# Patient Record
Sex: MALE
[De-identification: names, ages, dates, MRNs, and addresses within clinical notes are randomized per-mention and may not be internally consistent; named-entity substitution may affect disease eponyms.]

---

## 2023-11-22 ENCOUNTER — APPOINTMENT (OUTPATIENT)
Dept: ORTHOPEDIC SURGERY | Facility: CLINIC | Age: 58
End: 2023-11-22
Payer: OTHER MISCELLANEOUS

## 2023-11-22 VITALS — HEIGHT: 72 IN | BODY MASS INDEX: 27.09 KG/M2 | WEIGHT: 200 LBS

## 2023-11-22 DIAGNOSIS — Z78.9 OTHER SPECIFIED HEALTH STATUS: ICD-10-CM

## 2023-11-22 PROCEDURE — 72110 X-RAY EXAM L-2 SPINE 4/>VWS: CPT

## 2023-11-22 PROCEDURE — 72170 X-RAY EXAM OF PELVIS: CPT

## 2023-11-22 PROCEDURE — 99204 OFFICE O/P NEW MOD 45 MIN: CPT

## 2023-12-18 ENCOUNTER — APPOINTMENT (OUTPATIENT)
Dept: CT IMAGING | Facility: CLINIC | Age: 58
End: 2023-12-18

## 2023-12-18 ENCOUNTER — APPOINTMENT (OUTPATIENT)
Dept: MRI IMAGING | Facility: CLINIC | Age: 58
End: 2023-12-18
Payer: OTHER MISCELLANEOUS

## 2023-12-18 PROCEDURE — 72148 MRI LUMBAR SPINE W/O DYE: CPT

## 2023-12-27 ENCOUNTER — APPOINTMENT (OUTPATIENT)
Dept: ORTHOPEDIC SURGERY | Facility: CLINIC | Age: 58
End: 2023-12-27
Payer: OTHER MISCELLANEOUS

## 2023-12-27 PROCEDURE — 99213 OFFICE O/P EST LOW 20 MIN: CPT

## 2023-12-27 NOTE — ASSESSMENT
[FreeTextEntry1] : 58 M with work injury s/p L3-5 XLIF with posterior interspinous instrumentation with new onset radicular paina fter fall L5-S1 HNP on MRI.  CT L Spine to rule out pseudoarthrosis given back pain. MRI L spine does not show bridign bone across L4-5.  Pain management referral for possible MICHELLE FU after CT

## 2023-12-27 NOTE — DATA REVIEWED
[MRI] : MRI [Lumbar Spine] : lumbar spine [I independently reviewed and interpreted images and report] : I independently reviewed and interpreted images and report [FreeTextEntry1] : L5-s1 Right sided HNP.  mIld stenosis at L2-3.

## 2023-12-27 NOTE — HISTORY OF PRESENT ILLNESS
[Work related] : work related [9] : 9 [4] : 4 [Dull/Aching] : dull/aching [Intermittent] : intermittent [Household chores] : household chores [Leisure] : leisure [Nothing helps with pain getting better] : Nothing helps with pain getting better [Standing] : standing [Walking] : walking [Part time] : Work status: part time [de-identified] : 11/22/23: Patient is here for a work related injury that occurred on 1/25/13 after tackling a someone as a . Tried epidural. Had lumbar fusion in 7/2013, and tried PT. Was able to manage pain for years until falling in 9/2023. Numbness in quad and toes. Intermittent spasms. Pain radiates down right leg. Worsens with prolonged sitting. Discomfort with walking. No recent treatment. Tried lido patches, and Advil.   Fell down in September had repeat fall and reaggravated back pain.   Back pain initially and severe RLE radic to calf.  No left sided pain.  Has tried lidocaine patches and advil.   First time being evaluated since this fall.  Prior to this fall Had mild back pain no radicular complaints.  No bowel or bladder symptoms.  no fevers or chills.   Original surgery done by Dr. Austin at [facility].    Retired   12/27/23 follow up workers comp to review l spine mri , no changes.  CT L spine not approved.  Pain imrpoved with MDP but recurred.   [] : no [FreeTextEntry1] : lumbar spine  [FreeTextEntry3] : 1/25/2013 [FreeTextEntry7] : right leg  [de-identified] : mri done at [facility] [de-identified] : nothing

## 2023-12-27 NOTE — IMAGING
[de-identified] : Spine: Inspection/Palpation: No tenderness to palpation throughout Cervical/thoracic/lumbar spine. No bony stepoffs, No lesions.  Gait: Non-antalgic, able to perform bilateral toe and heel rise.  Able to perform tandem gait.    Neurologic:  Bilateral Lower Extremities 5/5 Iliopsoas/Quadriceps/Hamstrings/ Tibialis Anterior/ Gastrocnemius. Extensor Hallucis Longus/ Flexor Hallucis Longus except    Sensation intact to light touch L2-S1  Patellar/ Achilles reflex within normal limits.   X-ray Ap/Lateral/Flexion/Extension of lumbar spine were viewed and interpreted.   S/;P L3-5 XLIF and posterior interspinous instrumentation.  hardware in good position. L5-s1 disc height loss.

## 2023-12-28 ENCOUNTER — APPOINTMENT (OUTPATIENT)
Dept: PAIN MANAGEMENT | Facility: CLINIC | Age: 58
End: 2023-12-28
Payer: OTHER MISCELLANEOUS

## 2023-12-28 VITALS — BODY MASS INDEX: 26.14 KG/M2 | WEIGHT: 193 LBS | HEIGHT: 72 IN

## 2023-12-28 PROCEDURE — 99204 OFFICE O/P NEW MOD 45 MIN: CPT

## 2023-12-28 NOTE — ASSESSMENT
[FreeTextEntry1] : After discussing various treatment options with the patient including but not limited to oral medications, physical therapy, exercise, modalities as well as interventional spinal injections, we have decided with the following plan:  1) Intervention Injection Therapy: I personally reviewed the MRI/CT scan images and agree with the radiologist's report. The radiological findings were discussed with the patient. The risks, benefits, contents and alternatives to injection were explained in full to the patient. Risks outlined include but are not limited to infection,sepsis, bleeding, post-dural puncture headache, nerve damage, temporary increase in pain, syncopal episode, failure to resolve symptoms, allergic reaction, symptom recurrence, and elevation of blood sugar in diabetics. Cortisone may cause immunosuppression. Patient understands the risks. All questions were answered. After discussion of options, patient requested an injection. Information regarding the injection was given to the patient. Which medications to stop prior to the injection was explained to the patient as well.  Follow up in 1-2 weeks post injection for re-evaluation.  Continue Home exercises, stretching, activity modification, physical therapy, and conservative care.  Patient is presenting with acute/sub-acute radicular pain with impairment in ADLs and functionality.  The pain has not responded sufficiently to  conservative care including nsaid therapy and/or physical therapy.  There is no bleeding tendency, unstable medical condition, or systemic infection. The purpose of the spinal injections is to facilitate active therapy by providing short term relief through reduction of pain and inflammation.   Injections, by themselves, are not likely to provide long-term relief. Rather, active rehabilitation with modified work achieves long-term relief by increasing active ROM, strength and stability.   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  Caudal MICHELLE

## 2023-12-28 NOTE — HISTORY OF PRESENT ILLNESS
[Lower back] : lower back [Work related] : work related [7] : 7 [3] : 3 [Burning] : burning [Shooting] : shooting [Intermittent] : intermittent [Nothing helps with pain getting better] : Nothing helps with pain getting better [Sitting] : sitting [FreeTextEntry1] : 12/28/2023 : Patient presents for initial evaluation. He is having pain on his lower back, and it goes down to his buttocks and right leg. If he moves wrong the patient gets spasms.   Then fell 2 months ago and had pain in low back.  Has numbness in right thigh and into right toe.  Pain on right leg goes down back of right leg and top of left quad.  Feels some weakness in right leg.  Is working part time.   DOI: 1/25/13- while working as a  .   Awaiting CT scan to visualize if fused.    Subjective Weakness: Yes Numbness/Tingling: Yes Bladder/Bowel dysfunction: No Treatments Tried: Back surgery, physical therapy  Attempted modalities for current pain complaint: See above: Medications: Yes  Injections: Prior to surgery   Previous Spine Surgery: Dr. Townsend L3/4 L4/5 discectomy with interbody spacers - 2013  Imaging: MRI Lumbar Spine (12/18/23) post op at L3/4 L4/5. 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 central and right sided DH with mild compression of the right S1 nerve root.    [] : no [FreeTextEntry3] : 1/25/13 [FreeTextEntry6] : spasms  [FreeTextEntry7] : buttock, right leg [de-identified] : sitting straight, getting up in the morning  [de-identified] : l mri

## 2024-01-16 ENCOUNTER — RX RENEWAL (OUTPATIENT)
Age: 59
End: 2024-01-16

## 2024-01-22 ENCOUNTER — APPOINTMENT (OUTPATIENT)
Dept: CT IMAGING | Facility: CLINIC | Age: 59
End: 2024-01-22
Payer: OTHER MISCELLANEOUS

## 2024-01-22 PROCEDURE — 72131 CT LUMBAR SPINE W/O DYE: CPT

## 2024-01-25 ENCOUNTER — APPOINTMENT (OUTPATIENT)
Dept: PAIN MANAGEMENT | Facility: CLINIC | Age: 59
End: 2024-01-25
Payer: OTHER MISCELLANEOUS

## 2024-01-25 PROCEDURE — J3490M: CUSTOM

## 2024-01-25 PROCEDURE — 62323 NJX INTERLAMINAR LMBR/SAC: CPT

## 2024-02-15 ENCOUNTER — APPOINTMENT (OUTPATIENT)
Dept: PAIN MANAGEMENT | Facility: CLINIC | Age: 59
End: 2024-02-15
Payer: OTHER MISCELLANEOUS

## 2024-02-15 VITALS — HEIGHT: 72 IN | WEIGHT: 193 LBS | BODY MASS INDEX: 26.14 KG/M2

## 2024-02-15 PROCEDURE — 99213 OFFICE O/P EST LOW 20 MIN: CPT

## 2024-02-15 NOTE — HISTORY OF PRESENT ILLNESS
[Lower back] : lower back [Work related] : work related [3] : 3 [Shooting] : shooting [Intermittent] : intermittent [5] : 5 [Sharp] : sharp [Work] : work [Injection therapy] : injection therapy [Stairs] : stairs [FreeTextEntry1] : 02/15/2024: follow up today for caudal on 1/25 with 85% relief.  Occasionaly gets spasms in low back.  12/28/2023 : Patient presents for initial evaluation. He is having pain on his lower back, and it goes down to his buttocks and right leg. If he moves wrong the patient gets spasms.   Then fell 2 months ago and had pain in low back.  Has numbness in right thigh and into right toe.  Pain on right leg goes down back of right leg and top of left quad.  Feels some weakness in right leg.  Is working part time.   DOI: 1/25/13- while working as a  .   Awaiting CT scan to visualize if fused.    Subjective Weakness: Yes Numbness/Tingling: Yes Bladder/Bowel dysfunction: No Treatments Tried: Back surgery, physical therapy  Attempted modalities for current pain complaint: See above: Medications: Yes  Injections: Prior to surgery  Caudal 1/25/24 Previous Spine Surgery: Dr. Townsend L3/4 L4/5 discectomy with interbody spacers - 2013  Imaging: MRI Lumbar Spine (12/18/23) post op at L3/4 L4/5. 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 central and right sided DH with mild compression of the right S1 nerve root.    [] : no [FreeTextEntry3] : 1/25/13 [FreeTextEntry6] : spasms, weakness, [de-identified] : l mri

## 2024-03-15 ENCOUNTER — RX RENEWAL (OUTPATIENT)
Age: 59
End: 2024-03-15

## 2024-04-01 ENCOUNTER — APPOINTMENT (OUTPATIENT)
Dept: PAIN MANAGEMENT | Facility: CLINIC | Age: 59
End: 2024-04-01
Payer: OTHER MISCELLANEOUS

## 2024-04-01 VITALS — HEIGHT: 72 IN | WEIGHT: 200 LBS | BODY MASS INDEX: 27.09 KG/M2

## 2024-04-01 DIAGNOSIS — M51.27 OTHER INTERVERTEBRAL DISC DISPLACEMENT, LUMBOSACRAL REGION: ICD-10-CM

## 2024-04-01 PROCEDURE — 99213 OFFICE O/P EST LOW 20 MIN: CPT

## 2024-04-01 RX ORDER — PREGABALIN 75 MG/1
75 CAPSULE ORAL TWICE DAILY
Qty: 60 | Refills: 2 | Status: ACTIVE | COMMUNITY
Start: 2024-04-01 | End: 1900-01-01

## 2024-04-01 RX ORDER — METHYLPREDNISOLONE 4 MG/1
4 TABLET ORAL
Qty: 1 | Refills: 0 | Status: ACTIVE | COMMUNITY
Start: 2023-11-22 | End: 1900-01-01

## 2024-04-01 NOTE — HISTORY OF PRESENT ILLNESS
[Lower back] : lower back [Work related] : work related [5] : 5 [3] : 3 [Sharp] : sharp [Shooting] : shooting [Intermittent] : intermittent [Work] : work [Injection therapy] : injection therapy [Stairs] : stairs [10] : 10 [Radiating] : radiating [Walking] : walking [FreeTextEntry1] : 4/1/24- fu for pain in low back.  Has numbness in both legs and pain.  Will start Lyrica and send MDP.  He had great relief >80% from caudal in January for at least 2 months.  Would benefit from repeat.  Patient with at least 50% overall improvement following epidural. Improvements in ROM, strength and pain. This leads to an overall improvement in patients quality of life. I would recommend repeat MICHELLE for cumulative improvement.  02/15/2024: follow up today for caudal on 1/25 with 85% relief.  Occasionaly gets spasms in low back.  12/28/2023 : Patient presents for initial evaluation. He is having pain on his lower back, and it goes down to his buttocks and right leg. If he moves wrong the patient gets spasms.   Then fell 2 months ago and had pain in low back.  Has numbness in right thigh and into right toe.  Pain on right leg goes down back of right leg and top of left quad.  Feels some weakness in right leg.  Is working part time.   DOI: 1/25/13- while working as a  .   Awaiting CT scan to visualize if fused.    Subjective Weakness: Yes Numbness/Tingling: Yes Bladder/Bowel dysfunction: No Treatments Tried: Back surgery, physical therapy  Attempted modalities for current pain complaint: See above: Medications: Yes  Injections: Prior to surgery  Caudal 1/25/24 Previous Spine Surgery: Dr. Townsend L3/4 L4/5 discectomy with interbody spacers - 2013  Imaging: MRI Lumbar Spine (12/18/23) post op at L3/4 L4/5. 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 central and right sided DH with mild compression of the right S1 nerve root.    [] : no [FreeTextEntry3] : 1/25/13 [FreeTextEntry7] : b/l legs [FreeTextEntry6] : spasms, weakness, pins and needles  [de-identified] : l mri

## 2024-04-01 NOTE — ASSESSMENT
[FreeTextEntry1] : After discussing various treatment options with the patient including but not limited to oral medications, physical therapy, exercise, modalities as well as interventional spinal injections, we have decided with the following plan:  1) Intervention Injection Therapy: I personally reviewed the MRI/CT scan images and agree with the radiologist's report. The radiological findings were discussed with the patient. The risks, benefits, contents and alternatives to injection were explained in full to the patient. Risks outlined include but are not limited to infection,sepsis, bleeding, post-dural puncture headache, nerve damage, temporary increase in pain, syncopal episode, failure to resolve symptoms, allergic reaction, symptom recurrence, and elevation of blood sugar in diabetics. Cortisone may cause immunosuppression. Patient understands the risks. All questions were answered. After discussion of options, patient requested an injection. Information regarding the injection was given to the patient. Which medications to stop prior to the injection was explained to the patient as well.  Follow up in 1-2 weeks post injection for re-evaluation.  Continue Home exercises, stretching, activity modification, physical therapy, and conservative care.  Patient is presenting with acute/sub-acute radicular pain with impairment in ADLs and functionality.  The pain has not responded sufficiently to  conservative care including nsaid therapy and/or physical therapy.  There is no bleeding tendency, unstable medical condition, or systemic infection. The purpose of the spinal injections is to facilitate active therapy by providing short term relief through reduction of pain and inflammation.   Injections, by themselves, are not likely to provide long-term relief. Rather, active rehabilitation with modified work achieves long-term relief by increasing active ROM, strength and stability.   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  caudal MICHELLE  I would recommend a trial of neuropathic medication as patient presents with signs of nerve irritation. (ie burning, paresthesias etc) Goals of therapy would be to improve pain and overall QOL. Side effects reviewed with patient. Patient will call or stop medication if given side effects occur. .

## 2024-04-25 ENCOUNTER — APPOINTMENT (OUTPATIENT)
Dept: PAIN MANAGEMENT | Facility: CLINIC | Age: 59
End: 2024-04-25
Payer: OTHER MISCELLANEOUS

## 2024-04-25 PROCEDURE — 62323 NJX INTERLAMINAR LMBR/SAC: CPT

## 2024-04-25 NOTE — PROCEDURE
[FreeTextEntry3] : Date of Service: 04/25/2024   Account: [id number]   Patient: MALAIKA SU   YOB: 1965   Age: 58 year     Surgeon:      Marii Stafford M.D.   Pre-Operative Diagnosis:                              Lumbar Radiculitis (54.17)   Post Operative Diagnosis:                            Same   Procedure:                                                       Caudal epidural steroid injection  under fluoroscopic guidance   Anesthesia:                                                      None     This procedure was carried out using fluoroscopic guidance.  The risks and benefits of the procedure were discussed extensively with the patient.  Risks included infection, bleeding, epidural hematoma, nerve damage, and post-dural puncture headache.  The consent of the patient was obtained and the following procedure was performed.   The patient was placed in the prone position using a pillow under the abdomen to reduce the lumbar lordosis.  The lumbo-sacral area was prepped and draped in a sterile fashion.  Using fluoroscopic guidance the sacrum was visualized using a lateral view.   Using sterile technique the superficial skin was anesthetized with 1.5% Lidocaine without epinephrine.  A 22 inch spinal needle was advanced under fluoroscopy using [id number] technique until the sacral-coccygeal ligament was engaged and penetrated.  After confirmation of needle placement in the epidural space, the needle was advanced to approximately the S3 level.    After negative aspiration for heme and CSF, 3 cc of Omnipaque confirmed good lumbar epiduragram.  An AP view was used to confirm dye spread.  An injectate of 10 cc of preservative free normal saline, 0.25% marcaine and 1% Lidocaine plus 80 mg of Kenalog was then injected into the epidural space. The needle was subsequently removed and pressure was applied.   The patient tolerated the procedure well without any complications.  Vitals signs remained within normal limits throughout the procedure.  The patient was instructed to apply ice to the area for approximately 20 minutes 3-4  times for the next 24 hours.  The patient was also instructed to contact me immediately if there were any problems.   Marii Stafford M.D.

## 2024-05-07 ENCOUNTER — RX RENEWAL (OUTPATIENT)
Age: 59
End: 2024-05-07

## 2024-05-07 RX ORDER — MELOXICAM 15 MG/1
15 TABLET ORAL DAILY
Qty: 28 | Refills: 1 | Status: ACTIVE | COMMUNITY
Start: 2023-11-22 | End: 1900-01-01

## 2024-05-09 ENCOUNTER — APPOINTMENT (OUTPATIENT)
Dept: PAIN MANAGEMENT | Facility: CLINIC | Age: 59
End: 2024-05-09
Payer: OTHER MISCELLANEOUS

## 2024-05-09 VITALS — BODY MASS INDEX: 27.77 KG/M2 | WEIGHT: 205 LBS | HEIGHT: 72 IN

## 2024-05-09 DIAGNOSIS — M54.41 LUMBAGO WITH SCIATICA, RIGHT SIDE: ICD-10-CM

## 2024-05-09 DIAGNOSIS — G89.29 LUMBAGO WITH SCIATICA, RIGHT SIDE: ICD-10-CM

## 2024-05-09 PROCEDURE — 99214 OFFICE O/P EST MOD 30 MIN: CPT

## 2024-05-09 NOTE — ASSESSMENT

## 2024-05-09 NOTE — HISTORY OF PRESENT ILLNESS
[Lower back] : lower back [Work related] : work related [3] : 3 [Radiating] : radiating [Sharp] : sharp [Shooting] : shooting [Intermittent] : intermittent [Work] : work [Injection therapy] : injection therapy [Walking] : walking [Stairs] : stairs [7] : 7 [Leisure] : leisure [FreeTextEntry1] : 05/09/2024: follow up today for caudal on 4/25 without relief. Pain mostly in the lower back, worse in the morning. Gets better throughout the day. Lyrica caused 20lb weight gain. On Meloxicam for awhile.  Gets intermittent shooting pain in the legs and numbness in the feet with prolonged walking.   4/1/24- fu for pain in low back.  Has numbness in both legs and pain.  Will start Lyrica and send MDP.  He had great relief >80% from caudal in January for at least 2 months.  Would benefit from repeat.  Patient with at least 50% overall improvement following epidural. Improvements in ROM, strength and pain. This leads to an overall improvement in patients quality of life. I would recommend repeat MICHELLE for cumulative improvement.  02/15/2024: follow up today for caudal on 1/25 with 85% relief.  Occasionally gets spasms in low back.  12/28/2023 : Patient presents for initial evaluation. He is having pain on his lower back, and it goes down to his buttocks and right leg. If he moves wrong the patient gets spasms.   Then fell 2 months ago and had pain in low back.  Has numbness in right thigh and into right toe.  Pain on right leg goes down back of right leg and top of left quad.  Feels some weakness in right leg.  Is working part time.   DOI: 1/25/13- while working as a  .   Awaiting CT scan to visualize if fused.    Subjective Weakness: Yes Numbness/Tingling: Yes Bladder/Bowel dysfunction: No Treatments Tried: Back surgery, physical therapy  Attempted modalities for current pain complaint: See above: Medications: Yes  Injections: Prior to surgery  Caudal 1/25/24 Previous Spine Surgery: Dr. Townsend L3/4 L4/5 discectomy with interbody spacers - 2013  Imaging: MRI Lumbar Spine (12/18/23) post op at L3/4 L4/5. 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 central and right sided DH with mild compression of the right S1 nerve root.    [] : no [FreeTextEntry3] : 1/25/13 [FreeTextEntry6] : spasms, weakness, pins and needles  [FreeTextEntry7] : b/l legs, b/l feet  [de-identified] : l mri

## 2024-06-06 ENCOUNTER — APPOINTMENT (OUTPATIENT)
Dept: PAIN MANAGEMENT | Facility: CLINIC | Age: 59
End: 2024-06-06
Payer: OTHER MISCELLANEOUS

## 2024-06-06 VITALS — WEIGHT: 200 LBS | BODY MASS INDEX: 27.09 KG/M2 | HEIGHT: 72 IN

## 2024-06-06 DIAGNOSIS — M47.816 SPONDYLOSIS W/OUT MYELOPATHY OR RADICULOPATHY, LUMBAR REGION: ICD-10-CM

## 2024-06-06 PROCEDURE — 99214 OFFICE O/P EST MOD 30 MIN: CPT

## 2024-06-06 RX ORDER — HYDROCODONE BITARTRATE AND ACETAMINOPHEN 5; 325 MG/1; MG/1
5-325 TABLET ORAL EVERY 8 HOURS
Qty: 21 | Refills: 0 | Status: ACTIVE | COMMUNITY
Start: 2024-06-06 | End: 1900-01-01

## 2024-06-06 NOTE — HISTORY OF PRESENT ILLNESS
[Lower back] : lower back [Work related] : work related [3] : 3 [Radiating] : radiating [Sharp] : sharp [Shooting] : shooting [Intermittent] : intermittent [Leisure] : leisure [Work] : work [Injection therapy] : injection therapy [Walking] : walking [Stairs] : stairs [10] : 10 [7] : 7 [Stabbing] : stabbing [Constant] : constant [Household chores] : household chores [Sleep] : sleep [Standing] : standing [FreeTextEntry1] : 06/06/2024: Follow up today. Denial initially states that pain is mostly radicular, however patient biggest complaint is pain in the lower back and buttocks which is AXIAL. The blocks are meant to be diagnostic for facet mediated pain, so denial stating that there is no documentation of facet mediated pain is redundant.  The procedure is meant to help diagnose his axial lower back pain. No longer taking NSAIDS as he was gaining weight.  He has been more sedentary this last month.  You can still have facet mediated pain even if fused. Arthritis and spondylosis is still present despite fusion.  he has facet arthrosis on MRI and CT scan.   05/09/2024: follow up today for caudal on 4/25 without relief. Pain mostly in the lower back, worse in the morning. Gets better throughout the day. Lyrica caused 20lb weight gain. On Meloxicam for awhile.  Gets intermittent shooting pain in the legs and numbness in the feet with prolonged walking.   4/1/24- fu for pain in low back.  Has numbness in both legs and pain.  Will start Lyrica and send MDP.  He had great relief >80% from caudal in January for at least 2 months.  Would benefit from repeat.  Patient with at least 50% overall improvement following epidural. Improvements in ROM, strength and pain. This leads to an overall improvement in patients quality of life. I would recommend repeat MICHELLE for cumulative improvement.  02/15/2024: follow up today for caudal on 1/25 with 85% relief.  Occasionally gets spasms in low back.  12/28/2023 : Patient presents for initial evaluation. He is having pain on his lower back, and it goes down to his buttocks and right leg. If he moves wrong the patient gets spasms.   Then fell 2 months ago and had pain in low back.  Has numbness in right thigh and into right toe.  Pain on right leg goes down back of right leg and top of left quad.  Feels some weakness in right leg.  Is working part time.   DOI: 1/25/13- while working as a  .   Awaiting CT scan to visualize if fused.    Subjective Weakness: Yes Numbness/Tingling: Yes Bladder/Bowel dysfunction: No Treatments Tried: Back surgery, physical therapy  Attempted modalities for current pain complaint: See above: Medications: Yes  Injections: Prior to surgery  Caudal 1/25/24 Previous Spine Surgery: Dr. Townsend L3/4 L4/5 discectomy with interbody spacers - 2013  Imaging: MRI Lumbar Spine (12/18/23) post op at L3/4 L4/5. 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 central and right sided DH with mild compression of the right S1 nerve root.    [] : Post Surgical Visit: no [FreeTextEntry3] : 1/25/13 [FreeTextEntry6] : spasms, weakness, pins and needles  [FreeTextEntry7] : b/l legs, b/l feet, buttocks [de-identified] : l mri [de-identified] : getting up in the morning

## 2024-07-03 ENCOUNTER — APPOINTMENT (OUTPATIENT)
Dept: ORTHOPEDIC SURGERY | Facility: CLINIC | Age: 59
End: 2024-07-03

## 2024-07-18 ENCOUNTER — APPOINTMENT (OUTPATIENT)
Dept: PAIN MANAGEMENT | Facility: CLINIC | Age: 59
End: 2024-07-18
Payer: OTHER MISCELLANEOUS

## 2024-07-18 DIAGNOSIS — M47.816 SPONDYLOSIS W/OUT MYELOPATHY OR RADICULOPATHY, LUMBAR REGION: ICD-10-CM

## 2024-07-18 PROCEDURE — 64494 INJ PARAVERT F JNT L/S 2 LEV: CPT | Mod: 59,RT

## 2024-07-18 PROCEDURE — 64493 INJ PARAVERT F JNT L/S 1 LEV: CPT | Mod: LT

## 2024-07-18 PROCEDURE — J3490M: CUSTOM

## 2024-08-01 ENCOUNTER — APPOINTMENT (OUTPATIENT)
Dept: PAIN MANAGEMENT | Facility: CLINIC | Age: 59
End: 2024-08-01

## 2024-08-08 ENCOUNTER — APPOINTMENT (OUTPATIENT)
Dept: PAIN MANAGEMENT | Facility: CLINIC | Age: 59
End: 2024-08-08

## 2024-08-08 PROCEDURE — 99214 OFFICE O/P EST MOD 30 MIN: CPT

## 2024-08-08 NOTE — HISTORY OF PRESENT ILLNESS
[Lower back] : lower back [Work related] : work related [Radiating] : radiating [Sharp] : sharp [Constant] : constant [Work] : work [Injection therapy] : injection therapy [3] : 3 [1] : 2 [Shooting] : shooting [Walking] : walking [FreeTextEntry1] : 08/08/2024: follow up today for B/L lumbar MBB on 7/18 06/06/2024: Follow up today. Denial initially states that pain is mostly radicular, however patient biggest complaint is pain in the lower back and buttocks which is AXIAL. The blocks are meant to be diagnostic for facet mediated pain, so denial stating that there is no documentation of facet mediated pain is redundant.  The procedure is meant to help diagnose his axial lower back pain. No longer taking NSAIDS as he was gaining weight.  He has been more sedentary this last month.  You can still have facet mediated pain even if fused. Arthritis and spondylosis is still present despite fusion.  he has facet arthrosis on MRI and CT scan.   05/09/2024: follow up today for caudal on 4/25 without relief. Pain mostly in the lower back, worse in the morning. Gets better throughout the day. Lyrica caused 20lb weight gain. On Meloxicam for awhile.  Gets intermittent shooting pain in the legs and numbness in the feet with prolonged walking.   4/1/24- fu for pain in low back.  Has numbness in both legs and pain.  Will start Lyrica and send MDP.  He had great relief >80% from caudal in January for at least 2 months.  Would benefit from repeat.  Patient with at least 50% overall improvement following epidural. Improvements in ROM, strength and pain. This leads to an overall improvement in patients quality of life. I would recommend repeat MICHELLE for cumulative improvement.  02/15/2024: follow up today for caudal on 1/25 with 85% relief.  Occasionally gets spasms in low back.  12/28/2023 : Patient presents for initial evaluation. He is having pain on his lower back, and it goes down to his buttocks and right leg. If he moves wrong the patient gets spasms.   Then fell 2 months ago and had pain in low back.  Has numbness in right thigh and into right toe.  Pain on right leg goes down back of right leg and top of left quad.  Feels some weakness in right leg.  Is working part time.   DOI: 1/25/13- while working as a  .   Awaiting CT scan to visualize if fused.    Subjective Weakness: Yes Numbness/Tingling: Yes Bladder/Bowel dysfunction: No Treatments Tried: Back surgery, physical therapy  Attempted modalities for current pain complaint: See above: Medications: Yes  Injections: Prior to surgery  Caudal 1/25/24 B/L lumbar MBB 7/18/24 Previous Spine Surgery: Dr. Townsend L3/4 L4/5 discectomy with interbody spacers - 2013  Imaging: MRI Lumbar Spine (12/18/23) post op at L3/4 L4/5. 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 central and right sided DH with mild compression of the right S1 nerve root.    [] : Post Surgical Visit: no [FreeTextEntry3] : 1/25/13 [FreeTextEntry6] : spasms, weakness, pins and needles  [FreeTextEntry7] : b/l legs, b/l feet, buttocks [de-identified] : getting up in the morning [de-identified] : l mri intact

## 2024-08-08 NOTE — HISTORY OF PRESENT ILLNESS
[Lower back] : lower back [Work related] : work related [Radiating] : radiating [Sharp] : sharp [Constant] : constant [Work] : work [Injection therapy] : injection therapy [3] : 3 [1] : 2 [Shooting] : shooting [Walking] : walking [FreeTextEntry1] : 08/08/2024: follow up today for B/L lumbar MBB on 7/18 06/06/2024: Follow up today. Denial initially states that pain is mostly radicular, however patient biggest complaint is pain in the lower back and buttocks which is AXIAL. The blocks are meant to be diagnostic for facet mediated pain, so denial stating that there is no documentation of facet mediated pain is redundant.  The procedure is meant to help diagnose his axial lower back pain. No longer taking NSAIDS as he was gaining weight.  He has been more sedentary this last month.  You can still have facet mediated pain even if fused. Arthritis and spondylosis is still present despite fusion.  he has facet arthrosis on MRI and CT scan.   05/09/2024: follow up today for caudal on 4/25 without relief. Pain mostly in the lower back, worse in the morning. Gets better throughout the day. Lyrica caused 20lb weight gain. On Meloxicam for awhile.  Gets intermittent shooting pain in the legs and numbness in the feet with prolonged walking.   4/1/24- fu for pain in low back.  Has numbness in both legs and pain.  Will start Lyrica and send MDP.  He had great relief >80% from caudal in January for at least 2 months.  Would benefit from repeat.  Patient with at least 50% overall improvement following epidural. Improvements in ROM, strength and pain. This leads to an overall improvement in patients quality of life. I would recommend repeat MICHELLE for cumulative improvement.  02/15/2024: follow up today for caudal on 1/25 with 85% relief.  Occasionally gets spasms in low back.  12/28/2023 : Patient presents for initial evaluation. He is having pain on his lower back, and it goes down to his buttocks and right leg. If he moves wrong the patient gets spasms.   Then fell 2 months ago and had pain in low back.  Has numbness in right thigh and into right toe.  Pain on right leg goes down back of right leg and top of left quad.  Feels some weakness in right leg.  Is working part time.   DOI: 1/25/13- while working as a  .   Awaiting CT scan to visualize if fused.    Subjective Weakness: Yes Numbness/Tingling: Yes Bladder/Bowel dysfunction: No Treatments Tried: Back surgery, physical therapy  Attempted modalities for current pain complaint: See above: Medications: Yes  Injections: Prior to surgery  Caudal 1/25/24 B/L lumbar MBB 7/18/24 Previous Spine Surgery: Dr. Townsend L3/4 L4/5 discectomy with interbody spacers - 2013  Imaging: MRI Lumbar Spine (12/18/23) post op at L3/4 L4/5. 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 central and right sided DH with mild compression of the right S1 nerve root.    [] : Post Surgical Visit: no [FreeTextEntry3] : 1/25/13 [FreeTextEntry6] : spasms, weakness, pins and needles  [FreeTextEntry7] : b/l legs, b/l feet, buttocks [de-identified] : getting up in the morning [de-identified] : l mri

## 2024-08-08 NOTE — ASSESSMENT

## 2024-08-15 ENCOUNTER — APPOINTMENT (OUTPATIENT)
Dept: PAIN MANAGEMENT | Facility: CLINIC | Age: 59
End: 2024-08-15

## 2024-08-29 ENCOUNTER — APPOINTMENT (OUTPATIENT)
Dept: PAIN MANAGEMENT | Facility: CLINIC | Age: 59
End: 2024-08-29

## 2024-10-10 ENCOUNTER — APPOINTMENT (OUTPATIENT)
Dept: PAIN MANAGEMENT | Facility: CLINIC | Age: 59
End: 2024-10-10
Payer: OTHER MISCELLANEOUS

## 2024-10-10 DIAGNOSIS — M47.816 SPONDYLOSIS W/OUT MYELOPATHY OR RADICULOPATHY, LUMBAR REGION: ICD-10-CM

## 2024-10-10 PROCEDURE — 64493 INJ PARAVERT F JNT L/S 1 LEV: CPT | Mod: 59,RT

## 2024-10-10 PROCEDURE — 64494 INJ PARAVERT F JNT L/S 2 LEV: CPT | Mod: 59,RT

## 2024-10-10 PROCEDURE — J3490M: CUSTOM

## 2024-10-31 ENCOUNTER — APPOINTMENT (OUTPATIENT)
Dept: PAIN MANAGEMENT | Facility: CLINIC | Age: 59
End: 2024-10-31
Payer: OTHER MISCELLANEOUS

## 2024-10-31 VITALS — HEIGHT: 72 IN | WEIGHT: 210 LBS | BODY MASS INDEX: 28.44 KG/M2

## 2024-10-31 DIAGNOSIS — M47.816 SPONDYLOSIS W/OUT MYELOPATHY OR RADICULOPATHY, LUMBAR REGION: ICD-10-CM

## 2024-10-31 PROCEDURE — 99214 OFFICE O/P EST MOD 30 MIN: CPT

## 2024-12-19 ENCOUNTER — APPOINTMENT (OUTPATIENT)
Dept: PAIN MANAGEMENT | Facility: CLINIC | Age: 59
End: 2024-12-19
Payer: OTHER MISCELLANEOUS

## 2024-12-19 DIAGNOSIS — M47.816 SPONDYLOSIS W/OUT MYELOPATHY OR RADICULOPATHY, LUMBAR REGION: ICD-10-CM

## 2024-12-19 PROCEDURE — 64636Z: CUSTOM | Mod: 59,LT

## 2024-12-19 PROCEDURE — 64635 DESTROY LUMB/SAC FACET JNT: CPT | Mod: LT

## 2025-01-02 ENCOUNTER — APPOINTMENT (OUTPATIENT)
Dept: PAIN MANAGEMENT | Facility: CLINIC | Age: 60
End: 2025-01-02
Payer: OTHER MISCELLANEOUS

## 2025-01-02 VITALS — BODY MASS INDEX: 28.71 KG/M2 | WEIGHT: 212 LBS | HEIGHT: 72 IN

## 2025-01-02 DIAGNOSIS — M47.816 SPONDYLOSIS W/OUT MYELOPATHY OR RADICULOPATHY, LUMBAR REGION: ICD-10-CM

## 2025-01-02 PROCEDURE — 99214 OFFICE O/P EST MOD 30 MIN: CPT

## 2025-02-13 ENCOUNTER — APPOINTMENT (OUTPATIENT)
Dept: PAIN MANAGEMENT | Facility: CLINIC | Age: 60
End: 2025-02-13
Payer: OTHER MISCELLANEOUS

## 2025-02-13 VITALS — WEIGHT: 216 LBS | BODY MASS INDEX: 29.26 KG/M2 | HEIGHT: 72 IN

## 2025-02-13 DIAGNOSIS — M54.41 LUMBAGO WITH SCIATICA, RIGHT SIDE: ICD-10-CM

## 2025-02-13 DIAGNOSIS — M47.816 SPONDYLOSIS W/OUT MYELOPATHY OR RADICULOPATHY, LUMBAR REGION: ICD-10-CM

## 2025-02-13 DIAGNOSIS — G89.29 LUMBAGO WITH SCIATICA, RIGHT SIDE: ICD-10-CM

## 2025-02-13 PROCEDURE — 96372 THER/PROPH/DIAG INJ SC/IM: CPT

## 2025-02-13 PROCEDURE — 99214 OFFICE O/P EST MOD 30 MIN: CPT | Mod: 25

## 2025-03-20 ENCOUNTER — APPOINTMENT (OUTPATIENT)
Dept: PAIN MANAGEMENT | Facility: CLINIC | Age: 60
End: 2025-03-20
Payer: OTHER MISCELLANEOUS

## 2025-03-20 DIAGNOSIS — G89.29 LUMBAGO WITH SCIATICA, RIGHT SIDE: ICD-10-CM

## 2025-03-20 DIAGNOSIS — M54.41 LUMBAGO WITH SCIATICA, RIGHT SIDE: ICD-10-CM

## 2025-03-20 PROCEDURE — J3490M: CUSTOM

## 2025-03-20 PROCEDURE — 62323 NJX INTERLAMINAR LMBR/SAC: CPT

## 2025-04-10 ENCOUNTER — APPOINTMENT (OUTPATIENT)
Dept: PAIN MANAGEMENT | Facility: CLINIC | Age: 60
End: 2025-04-10
Payer: OTHER MISCELLANEOUS

## 2025-04-10 VITALS — BODY MASS INDEX: 31.15 KG/M2 | WEIGHT: 230 LBS | HEIGHT: 72 IN

## 2025-04-10 DIAGNOSIS — G89.29 LUMBAGO WITH SCIATICA, RIGHT SIDE: ICD-10-CM

## 2025-04-10 DIAGNOSIS — M54.41 LUMBAGO WITH SCIATICA, RIGHT SIDE: ICD-10-CM

## 2025-04-10 DIAGNOSIS — M51.27 OTHER INTERVERTEBRAL DISC DISPLACEMENT, LUMBOSACRAL REGION: ICD-10-CM

## 2025-04-10 PROCEDURE — 99213 OFFICE O/P EST LOW 20 MIN: CPT

## 2025-04-14 ENCOUNTER — TRANSCRIPTION ENCOUNTER (OUTPATIENT)
Age: 60
End: 2025-04-14

## 2025-05-27 ENCOUNTER — APPOINTMENT (OUTPATIENT)
Dept: PAIN MANAGEMENT | Facility: CLINIC | Age: 60
End: 2025-05-27
Payer: OTHER MISCELLANEOUS

## 2025-05-27 VITALS — HEIGHT: 72 IN | BODY MASS INDEX: 31.15 KG/M2 | WEIGHT: 230 LBS

## 2025-05-27 DIAGNOSIS — G89.29 LUMBAGO WITH SCIATICA, RIGHT SIDE: ICD-10-CM

## 2025-05-27 DIAGNOSIS — M47.816 SPONDYLOSIS W/OUT MYELOPATHY OR RADICULOPATHY, LUMBAR REGION: ICD-10-CM

## 2025-05-27 DIAGNOSIS — M54.41 LUMBAGO WITH SCIATICA, RIGHT SIDE: ICD-10-CM

## 2025-05-27 PROCEDURE — 99213 OFFICE O/P EST LOW 20 MIN: CPT

## 2025-07-10 ENCOUNTER — APPOINTMENT (OUTPATIENT)
Dept: PAIN MANAGEMENT | Facility: CLINIC | Age: 60
End: 2025-07-10